# Patient Record
Sex: MALE | Race: WHITE | URBAN - METROPOLITAN AREA
[De-identification: names, ages, dates, MRNs, and addresses within clinical notes are randomized per-mention and may not be internally consistent; named-entity substitution may affect disease eponyms.]

---

## 2018-05-07 PROBLEM — Z00.00 ENCOUNTER FOR PREVENTIVE HEALTH EXAMINATION: Status: ACTIVE | Noted: 2018-05-07

## 2020-02-08 ENCOUNTER — EMERGENCY (EMERGENCY)
Facility: HOSPITAL | Age: 60
LOS: 1 days | Discharge: HOME | End: 2020-02-08
Attending: EMERGENCY MEDICINE
Payer: COMMERCIAL

## 2020-02-08 VITALS
OXYGEN SATURATION: 100 % | TEMPERATURE: 98 F | RESPIRATION RATE: 18 BRPM | SYSTOLIC BLOOD PRESSURE: 146 MMHG | DIASTOLIC BLOOD PRESSURE: 78 MMHG | HEIGHT: 66 IN | HEART RATE: 58 BPM | WEIGHT: 199.96 LBS

## 2020-02-08 PROCEDURE — 99283 EMERGENCY DEPT VISIT LOW MDM: CPT | Mod: 25

## 2020-02-08 PROCEDURE — 67938 REMOVE EYELID FOREIGN BODY: CPT | Mod: RT

## 2020-02-08 PROCEDURE — 65220 REMOVE FOREIGN BODY FROM EYE: CPT

## 2020-02-08 RX ORDER — OFLOXACIN 0.3 %
1 DROPS OPHTHALMIC (EYE) ONCE
Refills: 0 | Status: COMPLETED | OUTPATIENT
Start: 2020-02-08 | End: 2020-02-08

## 2020-02-08 RX ADMIN — Medication 1 DROP(S): at 21:37

## 2020-02-08 NOTE — ED PROVIDER NOTE - CARE PROVIDER_API CALL
Willig, Jeffrey L (MD)  Ophthalmology  [address] [place] [address]  Phone: [phone]  Fax: [phone]  Follow Up Time:

## 2020-02-08 NOTE — ED PROVIDER NOTE - PATIENT PORTAL LINK FT
You can access the FollowMyHealth Patient Portal offered by [facility] by registering at the following website: [email or web address]. By joining [facility]’s FollowMyHealth portal, you will also be able to view your health information using other applications (apps) compatible with our system.

## 2020-02-08 NOTE — ED PROVIDER NOTE - PHYSICAL EXAMINATION
Right eye: + 2 metal flecs in cornea Right eye: + 2 metal flecs in cornea. Pt. stable appearing. I, Dr. Stokes, performed a face to face bedside interview with this patient regarding history of present illness, review of symptoms and relevant past medical, social and family history.  I completed an independent physical examination.  I have also reviewed the ACP's note(s) and discussed the plan with the ACP.

## 2020-02-08 NOTE — ED PROVIDER NOTE - OBJECTIVE STATEMENT
58 y/o M c/o foreign body in right eye x 2 days.  Patient states that he was drilling metal and thinks that a piece may have gotten in his eye.  Denies visual changes, discharge or fever.  Denies use of contact lenses.

## 2020-02-08 NOTE — ED PROVIDER NOTE - PROGRESS NOTE DETAILS
discussed case with Dr. Willig who advised to patch with bacitracin and call him in the morning to f/u in office

## 2020-02-08 NOTE — ED ADULT TRIAGE NOTE - NS ED TRIAGE AVPU SCALE
Patient resting on the stretcher. Call bell within reach; will continue to monitor.  Still has ice pack to the back of his head Alert-The patient is alert, awake and responds to voice. The patient is oriented to time, place, and person. The triage nurse is able to obtain subjective information.

## 2020-02-08 NOTE — ED PROVIDER NOTE - ATTENDING CONTRIBUTION TO CARE
I, Dr. Stokes, performed a face to face bedside interview with this patient regarding history of present illness, review of symptoms and relevant past medical, social and family history.  I completed an independent physical examination.  I have also reviewed the ACP's note(s) and discussed the plan with the ACP.

## 2022-06-30 NOTE — CHART NOTE - NSCHARTNOTEFT_GEN_A_CORE
Preop Phone Call:  - Able to Reach Patient:  yes  - Info given to: Seth Schilling patient having cardiac catheterization  -  and allergies confirmed: yes  - Medication Information Given: yes  - Medications To Take Norvasc, ASA, Toprol XL 50mg Ok to take a.m. meds w/sip of water  - Medications To Hold	  - Arrival Time: 0630  - NPO after: Midnight   - Understanding of Information Verbalized: yes  will have a  over the age of 18  for d/c home

## 2022-06-30 NOTE — H&P ADULT - NSICDXPASTMEDICALHX_GEN_ALL_CORE_FT
PAST MEDICAL HISTORY:  CAD (coronary artery disease)     HLD (hyperlipidemia)     HTN (hypertension)     Stented coronary artery

## 2022-06-30 NOTE — H&P ADULT - ASSESSMENT
62yr old male PMH HTN, HLD, CAD, stent for routine follow-up. Pt. had a stress test which revealed abnormal myocardial perfusion imaging with a medium sized area of moderate ischemia seen in the inferior walls. Pt. now referred for [facility] for further evaluation.     62yr old male PMH HTN, HLD, CAD, stents presented to cardiology for routine follow-up. Pt. had a stress test which revealed abnormal myocardial perfusion imaging with a medium sized area of moderate ischemia seen in the inferior walls. Pt. now referred for [facility] for further evaluation.        ASA class: II  Creatinine: 0.93  GFR: 92  Bleeding  Risk score:   Ceferino Score:  1 pt 62yr old male PMH HTN, HLD, CAD, stents presented to cardiology for routine follow-up. Pt. had a stress test which revealed abnormal myocardial perfusion imaging with a medium sized area of moderate ischemia seen in the inferior walls. Pt. now referred for [facility] for further evaluation.        ASA class: II  Creatinine: 0.93  GFR: 92  Bleeding  Risk score: 0.7%  Ceferino Score:  1 pt

## 2022-06-30 NOTE — H&P ADULT - PROBLEM SELECTOR PLAN 1
a/w abnormal stress test  -plan for cardiac cath for ischemic work up  - IVF  cc bolus   -Consent obtained for cardiac catheterization w/ coronary angiogram and possible stent placement. Pt is competent, has capacity, and understands risks and benefits of procedure. Risks and benefits discussed. Risk discussed included, but not limited to MI, stroke, mortality, major bleeding, arrythmia, or infection. All questions answered

## 2022-06-30 NOTE — H&P ADULT - NSHPLABSRESULTS_GEN_ALL_CORE
6/22/22- EKG SR rate 60 bpm    Stress test- Inferior wall defect, 2mm ST depressions 6/22/22- EKG SR rate 60 bpm    6/22/22 Stress test- Inferior wall defect, 2mm ST depressions

## 2022-06-30 NOTE — H&P ADULT - HISTORY OF PRESENT ILLNESS
62yr old male PMH HTN, HLD, CAD, stent for routine follow-up. Pt. had a stress test which revealed abnormal myocardial perfusion imaging with a medium sized area of moderate ischemia seen in the inferior walls. Pt. now referred for [facility] for further evaluation.   62yr old male PMH HTN, HLD, CAD, stent presented to cardiology routine follow-up. Pt. had a stress test which revealed abnormal myocardial perfusion imaging with a medium sized area of moderate ischemia seen in the inferior walls. Pt. now referred for [facility] for further evaluation.   62yr old male PMH HTN, HLD, CAD with stent in 2009,  presented to cardiology routine follow-up. Pt. had a stress test which revealed abnormal myocardial perfusion imaging with a medium sized area of moderate ischemia seen in the inferior walls. Pt. now referred for [facility] for further evaluation.

## 2022-07-01 ENCOUNTER — OUTPATIENT (OUTPATIENT)
Dept: OUTPATIENT SERVICES | Facility: HOSPITAL | Age: 62
LOS: 1 days | Discharge: HOME | End: 2022-07-01
Payer: COMMERCIAL

## 2022-07-01 VITALS
OXYGEN SATURATION: 100 % | DIASTOLIC BLOOD PRESSURE: 81 MMHG | WEIGHT: 195.11 LBS | RESPIRATION RATE: 16 BRPM | HEART RATE: 61 BPM | HEIGHT: 66 IN | SYSTOLIC BLOOD PRESSURE: 150 MMHG | TEMPERATURE: 98 F

## 2022-07-01 DIAGNOSIS — I25.10 ATHEROSCLEROTIC HEART DISEASE OF NATIVE CORONARY ARTERY WITHOUT ANGINA PECTORIS: ICD-10-CM

## 2022-07-01 DIAGNOSIS — R94.39 ABNORMAL RESULT OF OTHER CARDIOVASCULAR FUNCTION STUDY: ICD-10-CM

## 2022-07-01 PROCEDURE — 80048 BASIC METABOLIC PNL TOTAL CA: CPT

## 2022-07-01 PROCEDURE — C9600: CPT | Mod: RC

## 2022-07-01 PROCEDURE — 93010 ELECTROCARDIOGRAM REPORT: CPT

## 2022-07-01 PROCEDURE — C1894: CPT

## 2022-07-01 PROCEDURE — 85025 COMPLETE CBC W/AUTO DIFF WBC: CPT

## 2022-07-01 PROCEDURE — 86901 BLOOD TYPING SEROLOGIC RH(D): CPT

## 2022-07-01 PROCEDURE — 36415 COLL VENOUS BLD VENIPUNCTURE: CPT

## 2022-07-01 PROCEDURE — C1769: CPT

## 2022-07-01 PROCEDURE — 93458 L HRT ARTERY/VENTRICLE ANGIO: CPT | Mod: 59

## 2022-07-01 PROCEDURE — C1725: CPT

## 2022-07-01 PROCEDURE — 86850 RBC ANTIBODY SCREEN: CPT

## 2022-07-01 PROCEDURE — 86900 BLOOD TYPING SEROLOGIC ABO: CPT

## 2022-07-01 PROCEDURE — C1874: CPT

## 2022-07-01 PROCEDURE — C1887: CPT

## 2022-07-01 PROCEDURE — 86803 HEPATITIS C AB TEST: CPT

## 2022-07-01 PROCEDURE — 93005 ELECTROCARDIOGRAM TRACING: CPT

## 2022-07-01 RX ORDER — ASPIRIN/CALCIUM CARB/MAGNESIUM 324 MG
1 TABLET ORAL
Qty: 0 | Refills: 0 | DISCHARGE

## 2022-07-01 RX ORDER — METOPROLOL TARTRATE 50 MG
12.5 TABLET ORAL DAILY
Refills: 0 | Status: DISCONTINUED | OUTPATIENT
Start: 2022-07-02 | End: 2022-07-02

## 2022-07-01 RX ORDER — CLOPIDOGREL BISULFATE 75 MG/1
1 TABLET, FILM COATED ORAL
Qty: 90 | Refills: 4
Start: 2022-07-01 | End: 2023-09-23

## 2022-07-01 RX ORDER — METOPROLOL TARTRATE 50 MG
0.5 TABLET ORAL
Qty: 0 | Refills: 0 | DISCHARGE

## 2022-07-01 RX ORDER — ATORVASTATIN CALCIUM 80 MG/1
80 TABLET, FILM COATED ORAL AT BEDTIME
Refills: 0 | Status: DISCONTINUED | OUTPATIENT
Start: 2022-07-01 | End: 2022-07-02

## 2022-07-01 RX ORDER — ROSUVASTATIN CALCIUM 5 MG/1
1 TABLET ORAL
Qty: 0 | Refills: 0 | DISCHARGE

## 2022-07-01 RX ORDER — AMLODIPINE BESYLATE 2.5 MG/1
1 TABLET ORAL
Qty: 0 | Refills: 0 | DISCHARGE

## 2022-07-01 RX ORDER — SODIUM CHLORIDE 9 MG/ML
250 INJECTION INTRAMUSCULAR; INTRAVENOUS; SUBCUTANEOUS ONCE
Refills: 0 | Status: COMPLETED | OUTPATIENT
Start: 2022-07-01 | End: 2022-07-01

## 2022-07-01 RX ORDER — CLOPIDOGREL BISULFATE 75 MG/1
75 TABLET, FILM COATED ORAL DAILY
Refills: 0 | Status: DISCONTINUED | OUTPATIENT
Start: 2022-07-02 | End: 2022-07-02

## 2022-07-01 RX ORDER — SODIUM CHLORIDE 9 MG/ML
1000 INJECTION INTRAMUSCULAR; INTRAVENOUS; SUBCUTANEOUS
Refills: 0 | Status: DISCONTINUED | OUTPATIENT
Start: 2022-07-01 | End: 2022-07-02

## 2022-07-01 RX ORDER — ONDANSETRON 8 MG/1
4 TABLET, FILM COATED ORAL EVERY 8 HOURS
Refills: 0 | Status: DISCONTINUED | OUTPATIENT
Start: 2022-07-01 | End: 2022-07-02

## 2022-07-01 RX ORDER — METOPROLOL TARTRATE 50 MG
1 TABLET ORAL
Qty: 0 | Refills: 0 | DISCHARGE

## 2022-07-01 RX ORDER — ASPIRIN/CALCIUM CARB/MAGNESIUM 324 MG
81 TABLET ORAL DAILY
Refills: 0 | Status: DISCONTINUED | OUTPATIENT
Start: 2022-07-02 | End: 2022-07-02

## 2022-07-01 RX ORDER — ACETAMINOPHEN 500 MG
650 TABLET ORAL EVERY 6 HOURS
Refills: 0 | Status: DISCONTINUED | OUTPATIENT
Start: 2022-07-01 | End: 2022-07-02

## 2022-07-01 RX ORDER — EZETIMIBE 10 MG/1
1 TABLET ORAL
Qty: 0 | Refills: 0 | DISCHARGE

## 2022-07-01 RX ORDER — AMLODIPINE BESYLATE 2.5 MG/1
5 TABLET ORAL DAILY
Refills: 0 | Status: DISCONTINUED | OUTPATIENT
Start: 2022-07-01 | End: 2022-07-02

## 2022-07-01 RX ADMIN — SODIUM CHLORIDE 250 MILLILITER(S): 9 INJECTION INTRAMUSCULAR; INTRAVENOUS; SUBCUTANEOUS at 07:55

## 2022-07-01 RX ADMIN — SODIUM CHLORIDE 178 MILLILITER(S): 9 INJECTION INTRAMUSCULAR; INTRAVENOUS; SUBCUTANEOUS at 10:17

## 2022-07-01 NOTE — PACU DISCHARGE NOTE - COMMENTS
s/p C x1 stent to RCA. right wrist pressure drsg in place. CDI, no evidence of hematoma or bleeding. bedrest until 2pm. vital signs stable. a/ox4 awake and alert. report given to ДМИТРИЙ Artis. transported on zoll

## 2022-07-01 NOTE — ASU PATIENT PROFILE, ADULT - FALL HARM RISK - UNIVERSAL INTERVENTIONS
Bed in lowest position, wheels locked, appropriate side rails in place/Call bell, personal items and telephone in reach/Instruct patient to call for assistance before getting out of bed or chair/Non-slip footwear when patient is out of bed/[place] to call system/Physically safe environment - no spills, clutter or unnecessary equipment/Purposeful Proactive Rounding/Room/bathroom lighting operational, light cord in reach

## 2022-07-01 NOTE — CHART NOTE - NSCHARTNOTEFT_GEN_A_CORE
Patient is a 62y old  Male S/P [facility]    Patient status post cath via RFA Site clean, dry, intact. Pulses present, capillary refill appropriate.  no signs of hematoma present, surrounding area soft. VSS no c/o pain. Patient resting comfortably in bed

## 2022-07-01 NOTE — PATIENT PROFILE ADULT - FALL HARM RISK - HARM RISK INTERVENTIONS

## 2022-07-02 ENCOUNTER — TRANSCRIPTION ENCOUNTER (OUTPATIENT)
Age: 62
End: 2022-07-02

## 2022-07-02 VITALS
TEMPERATURE: 97 F | DIASTOLIC BLOOD PRESSURE: 67 MMHG | OXYGEN SATURATION: 96 % | SYSTOLIC BLOOD PRESSURE: 128 MMHG | HEART RATE: 48 BPM | RESPIRATION RATE: 18 BRPM

## 2022-07-02 LAB
ANION GAP SERPL CALC-SCNC: 6 MMOL/L — SIGNIFICANT CHANGE UP (ref 5–17)
BASOPHILS # BLD AUTO: 0.04 K/UL — SIGNIFICANT CHANGE UP (ref 0–0.2)
BASOPHILS NFR BLD AUTO: 0.7 % — SIGNIFICANT CHANGE UP (ref 0–2)
BUN SERPL-MCNC: 14 MG/DL — SIGNIFICANT CHANGE UP (ref 7–23)
CALCIUM SERPL-MCNC: 9.8 MG/DL — SIGNIFICANT CHANGE UP (ref 8.5–10.1)
CHLORIDE SERPL-SCNC: 113 MMOL/L — HIGH (ref 96–108)
CO2 SERPL-SCNC: 22 MMOL/L — SIGNIFICANT CHANGE UP (ref 22–31)
CREAT SERPL-MCNC: 0.9 MG/DL — SIGNIFICANT CHANGE UP (ref 0.5–1.3)
EGFR: 95 ML/MIN/1.73M2 — SIGNIFICANT CHANGE UP
EOSINOPHIL # BLD AUTO: 0.17 K/UL — SIGNIFICANT CHANGE UP (ref 0–0.5)
EOSINOPHIL NFR BLD AUTO: 2.9 % — SIGNIFICANT CHANGE UP (ref 0–6)
GLUCOSE SERPL-MCNC: 102 MG/DL — HIGH (ref 70–99)
HCT VFR BLD CALC: 43.3 % — SIGNIFICANT CHANGE UP (ref 39–50)
HCV AB S/CO SERPL IA: 0.12 S/CO — SIGNIFICANT CHANGE UP (ref 0–0.99)
HCV AB SERPL-IMP: SIGNIFICANT CHANGE UP
HGB BLD-MCNC: 14.4 G/DL — SIGNIFICANT CHANGE UP (ref 13–17)
IMM GRANULOCYTES NFR BLD AUTO: 0.3 % — SIGNIFICANT CHANGE UP (ref 0–1.5)
LYMPHOCYTES # BLD AUTO: 1.32 K/UL — SIGNIFICANT CHANGE UP (ref 1–3.3)
LYMPHOCYTES # BLD AUTO: 22.7 % — SIGNIFICANT CHANGE UP (ref 13–44)
MCHC RBC-ENTMCNC: 30.2 PG — SIGNIFICANT CHANGE UP (ref 27–34)
MCHC RBC-ENTMCNC: 33.3 GM/DL — SIGNIFICANT CHANGE UP (ref 32–36)
MCV RBC AUTO: 90.8 FL — SIGNIFICANT CHANGE UP (ref 80–100)
MONOCYTES # BLD AUTO: 0.49 K/UL — SIGNIFICANT CHANGE UP (ref 0–0.9)
MONOCYTES NFR BLD AUTO: 8.4 % — SIGNIFICANT CHANGE UP (ref 2–14)
NEUTROPHILS # BLD AUTO: 3.77 K/UL — SIGNIFICANT CHANGE UP (ref 1.8–7.4)
NEUTROPHILS NFR BLD AUTO: 65 % — SIGNIFICANT CHANGE UP (ref 43–77)
PLATELET # BLD AUTO: 152 K/UL — SIGNIFICANT CHANGE UP (ref 150–400)
POTASSIUM SERPL-MCNC: 4 MMOL/L — SIGNIFICANT CHANGE UP (ref 3.5–5.3)
POTASSIUM SERPL-SCNC: 4 MMOL/L — SIGNIFICANT CHANGE UP (ref 3.5–5.3)
RBC # BLD: 4.77 M/UL — SIGNIFICANT CHANGE UP (ref 4.2–5.8)
RBC # FLD: 12.9 % — SIGNIFICANT CHANGE UP (ref 10.3–14.5)
SODIUM SERPL-SCNC: 141 MMOL/L — SIGNIFICANT CHANGE UP (ref 135–145)
WBC # BLD: 5.81 K/UL — SIGNIFICANT CHANGE UP (ref 3.8–10.5)
WBC # FLD AUTO: 5.81 K/UL — SIGNIFICANT CHANGE UP (ref 3.8–10.5)

## 2022-07-02 PROCEDURE — 93010 ELECTROCARDIOGRAM REPORT: CPT

## 2022-07-02 RX ADMIN — AMLODIPINE BESYLATE 5 MILLIGRAM(S): 2.5 TABLET ORAL at 09:09

## 2022-07-02 RX ADMIN — Medication 81 MILLIGRAM(S): at 09:08

## 2022-07-02 RX ADMIN — Medication 12.5 MILLIGRAM(S): at 09:09

## 2022-07-02 RX ADMIN — CLOPIDOGREL BISULFATE 75 MILLIGRAM(S): 75 TABLET, FILM COATED ORAL at 09:08

## 2022-07-02 NOTE — DISCHARGE NOTE NURSING/CASE MANAGEMENT/SOCIAL WORK - NSDCPEFALRISK_GEN_ALL_CORE
For information on Fall & Injury Prevention, visit: [email or web address] OR  [email or web address] OR  https://www.cdc.gov/steadi/patient.html

## 2022-07-02 NOTE — PROGRESS NOTE ADULT - ASSESSMENT
62yr old male PMH HTN, HLD, CAD with stent in 2009,  presented to cardiology routine follow-up. Pt. had a stress test which revealed abnormal myocardial perfusion imaging with a medium sized area of moderate ischemia seen in the inferior walls.       #CAD  - s/p [facility] with ASHLEY top RCA x 1   -encourage po hydration  -medical Mx for CAD  -Post cath instructions reviewed, patient verbalizes and understands instructions  -f/u with Dr. Cooper in 2 weeks   -Plan discussed with patient, RN   -stable for d/c home today

## 2022-07-02 NOTE — DISCHARGE NOTE PROVIDER - CARE PROVIDER_API CALL
Say Cooper)  Cardiovascular Disease; Nuclear Cardiology  [address] [place] [address]  Phone: [phone]  Fax: [phone]  Established Patient  Follow Up Time: 2 weeks

## 2022-07-02 NOTE — DISCHARGE NOTE PROVIDER - NSDCCPCAREPLAN_GEN_ALL_CORE_FT
PRINCIPAL DISCHARGE DIAGNOSIS  Diagnosis: CAD (coronary artery disease)  Assessment and Plan of Treatment: Continue all medications including ASA and new medication Plavix  DO NOT STOP taking these medicines, they keep your stent open without first asking your Cardiologist.   Follow up with Dr. Cooper in 1-2 week    You may shower, no baths/swimming x one week. No heavy lifting greater than 5-10 pounds with right wrist x one week. No strenuous activity x 3 weeks. Monitor site of procedure and notify your doctor for any redness, swelling, discharge  Bandage: Keep bandaid clean and dry. replace when wet  Special Instructions: procedure was done in your wrist, avoid reaching or placing too much pressure on that arm or wrist for 48 hours.  If you have bleeding from the procedure site: Lie down and remove the bandage. Apply hard pressure and call your doctor. If bleeding and swelling cannot be controlled, call 911.   Call your doctor immediately if: 1) You have severe pain, swelling, or bruising at the procedure site. A small amount or soreness and bruising (black and blue) is normal. 2) Procedure site becomes very red or feels hot to touch. 3) Your hand becomes blue or feels cold to touch. 4)You feel sudden back or belly pain. 5)You develop fever

## 2022-07-02 NOTE — DISCHARGE NOTE PROVIDER - HOSPITAL COURSE
62yr old male PMH HTN, HLD, CAD with stent in 2009,  presented to cardiology routine follow-up. Pt. had a stress test which revealed abnormal myocardial perfusion imaging with a medium sized area of moderate ischemia seen in the inferior walls.     < from: Cardiac Catheterization (07.01.22 @ 08:49) >   Impression     Diagnostic Conclusions   Three Vessel coronary artery disease (LAD,LCX,RCA)     Patent drug eluting stent in the proximal LCX.   Successful stent deployment to the distal Right coronary artery via drug   eluting stent .   Elevated left ventricular end-diastolic pressure 19 mm Hg.     Interventional Conclusions     Successful Coronary Intervention ASHLEY of distal RCA.     Recommendations     Aggressive medical management of coronary artery disease and its   underlying risk factors.   Observation overnight.    Estimated Blood Loss:4ml.     Signatures     ----------------------------------------------------------------   Electronically signed by Jeannette Heard MD, Director of   Cardiac Cath Lab(Performing Physician) on 07/01/2022 15:13   ----------------------------------------------------------------    < end of copied text >      #CAD  - s/p [facility] with ASHLEY top RCA x 1   -encourage po hydration  -medical Mx for CAD  -Post cath instructions reviewed, patient verbalizes and understands instructions  -f/u with Dr. Cooper in 2 weeks   -Plan discussed with patient, RN   -stable for d/c home today

## 2022-07-02 NOTE — DISCHARGE NOTE NURSING/CASE MANAGEMENT/SOCIAL WORK - PATIENT PORTAL LINK FT
You can access the FollowMyHealth Patient Portal offered by [facility] by registering at the following website: [email or web address]. By joining [facility]’s FollowMyHealth portal, you will also be able to view your health information using other applications (apps) compatible with our system.

## 2022-07-02 NOTE — DISCHARGE NOTE PROVIDER - NSDCMRMEDTOKEN_GEN_ALL_CORE_FT
amLODIPine 5 mg oral tablet: 1 tab(s) orally once a day  Aspir 81 oral delayed release tablet: 1 tab(s) orally once a day  clopidogrel 75 mg oral tablet: 1 tab(s) orally once a day  Crestor 40 mg oral tablet: 1 tab(s) orally once a day  metoprolol succinate 25 mg oral capsule, extended release: 0.5 cap(s) orally once a day  Zetia 10 mg oral tablet: 1 tab(s) orally once a day

## 2022-07-02 NOTE — PROGRESS NOTE ADULT - SUBJECTIVE AND OBJECTIVE BOX
Cath Lab Nurse Practitioner Note  HPI:  62yr old male PMH HTN, HLD, CAD with stent in 2009,  presented to cardiology routine follow-up. Pt. had a stress test which revealed abnormal myocardial perfusion imaging with a medium sized area of moderate ischemia seen in the inferior walls. Pt. now referred for [facility] for further evaluation.   (30 Jun 2022 18:52)    s/p LHC : ASHLEY to RCA x 1   Pt denies chest pain/SOB/palpitations post cath.    Vital Signs  Vital Signs Last 24 Hrs  T(C): 36.8 (01 Jul 2022 07:15), Max: 36.8 (01 Jul 2022 07:02)  T(F): 98.2 (01 Jul 2022 07:15), Max: 98.2 (01 Jul 2022 07:02)  HR: 55 (01 Jul 2022 09:55) (55 - 61)  BP: 155/78 (01 Jul 2022 09:55) (150/81 - 155/78)  BP(mean): --  RR: 16 (01 Jul 2022 09:55) (16 - 16)  SpO2: 96% (01 Jul 2022 09:55) (96% - 100%)      Labs:        MEDICATIONS  (STANDING):  amLODIPine   Tablet 5 milliGRAM(s) Oral daily  atorvastatin 80 milliGRAM(s) Oral at bedtime  sodium chloride 0.9% Bolus 250 milliLiter(s) IV Bolus Once  sodium chloride 0.9%. 1000 milliLiter(s) (178 mL/Hr) IV Continuous <Continuous>      PHYSICAL EXAM  GENERAL: NAD, AAOx3  CHEST/LUNG: Clear to auscultation bilaterally; No wheeze  HEART: s1 s2 Regular rate and rhythm; No murmurs, rubs, or gallops  ABDOMEN: Soft, Nontender, Nondistended; Bowel sounds present X 4 quadrants   EXTREMITIES:  2+ Peripheral Pulses, No clubbing, cyanosis, or edema  Psych: normal affect and behavior, calm and cooperative   PROCEDURE SITE: RRA accessed, hemoband to be removed 2 hours post placement. Site is without hematoma or bleeding. Sensation and ROCIO intact. Distal pulses palpable 2+, capillary refill < 2 seconds. Patient denies pain, numbness, tingling, CP or SOB.       EKG POST PCI- Sinus Ancelmo 59 RBBB no change from pre-[facility]    PROCEDURE RESULTS full report to follow         
Cath Lab Nurse Practitioner Note  HPI:  62yr old male PMH HTN, HLD, CAD with stent in 2009,  presented to cardiology routine follow-up. Pt. had a stress test which revealed abnormal myocardial perfusion imaging with a medium sized area of moderate ischemia seen in the inferior walls. Pt. now referred for [facility] for further evaluation.   (30 Jun 2022 18:52)    s/p LHC : POD #1;  ASHLEY to RCA     subjective/Observations: seen and examined and evaluated. resting comfortably in bed in NAD, with no respiratory distress, no chest pain, dyspnea, palpitations, PND, or orthopnea.    INTERVAL/OVERNIGHT EVENTS SB on tele, no acute events     Vital Signs Last 24 Hrs  T(C): 36.3 (02 Jul 2022 07:41), Max: 36.8 (01 Jul 2022 13:00)  T(F): 97.4 (02 Jul 2022 07:41), Max: 98.3 (01 Jul 2022 13:00)  HR: 48 (02 Jul 2022 07:41) (48 - 63)  BP: 128/67 (02 Jul 2022 07:41) (109/60 - 155/78)  BP(mean): 77 (02 Jul 2022 07:41) (77 - 77)  RR: 18 (02 Jul 2022 07:41) (16 - 18)  SpO2: 96% (02 Jul 2022 07:41) (95% - 99%)    PHYSICAL EXAM:  NEURO: Non-focal, AxOx3.  No neuro deficits NECK: Supple, No JVD, No LAD  CHEST/LUNG: Clear to auscultation bilaterally; No wheeze  HEART: s1 s2 Regular rate and rhythm; No murmurs, rubs, or gallops  ABDOMEN: Soft, Nontender, Nondistended; Bowel sounds present X 4 quadrants   EXTREMITIES:  2+ Peripheral Pulses, No clubbing, cyanosis, or edema   VASCULAR: Peripheral pulses palpable 2+ bilaterally  PROCEDURE SITE: RRA accessed, ,Site is without hematoma or bleeding. Sensation and ROCIO intact. Distal pulses palpable 2+, capillary refill < 2 seconds. Patient denies pain, numbness, tingling, CP or SOB. Clean dry bandaid applied     Labs:                        14.4   5.81  )-----------( 152      ( 02 Jul 2022 06:32 )             43.3     07-02    141  |  113<H>  |  14  ----------------------------<  102<H>  4.0   |  22  |  0.90    Ca    9.8      02 Jul 2022 06:32              MEDICATIONS  (STANDING):  amLODIPine   Tablet 5 milliGRAM(s) Oral daily  aspirin enteric coated 81 milliGRAM(s) Oral daily  atorvastatin 80 milliGRAM(s) Oral at bedtime  clopidogrel Tablet 75 milliGRAM(s) Oral daily  metoprolol succinate ER 12.5 milliGRAM(s) Oral daily  sodium chloride 0.9%. 1000 milliLiter(s) (178 mL/Hr) IV Continuous <Continuous>      RADIOLOGY < from: Cardiac Catheterization (07.01.22 @ 08:49) >   Impression     Diagnostic Conclusions   Three Vessel coronary artery disease (LAD,LCX,RCA)     Patent drug eluting stent in the proximal LCX.   Successful stent deployment to the distal Right coronary artery via drug   eluting stent .   Elevated left ventricular end-diastolic pressure 19 mm Hg.     Interventional Conclusions     Successful Coronary Intervention ASHLEY of distal RCA.     Recommendations     Aggressive medical management of coronary artery disease and its   underlying risk factors.   Observation overnight.    Estimated Blood Loss:4ml.     Signatures     ----------------------------------------------------------------   Electronically signed by Jeannette Heard MD, Director of   Cardiac Cath Lab(Performing Physician) on 07/01/2022 15:13   ----------------------------------------------------------------    < end of copied text >      EKG AM post PCI  SB rate 49

## 2022-07-05 DIAGNOSIS — I25.10 ATHEROSCLEROTIC HEART DISEASE OF NATIVE CORONARY ARTERY WITHOUT ANGINA PECTORIS: ICD-10-CM

## 2022-07-05 DIAGNOSIS — Z95.5 PRESENCE OF CORONARY ANGIOPLASTY IMPLANT AND GRAFT: ICD-10-CM

## 2022-07-05 DIAGNOSIS — R94.39 ABNORMAL RESULT OF OTHER CARDIOVASCULAR FUNCTION STUDY: ICD-10-CM

## 2023-11-27 NOTE — ED ADULT TRIAGE NOTE - SPO2 (%)
Consult Placed     Who: Dr. Chaim Aase   Date: 11/27/2023  Time:11:24 AM       Electronically signed by Maria Esther Snow RN on 11/27/2023 at 11:24 AM 100